# Patient Record
Sex: MALE | Race: WHITE | NOT HISPANIC OR LATINO | URBAN - NONMETROPOLITAN AREA
[De-identification: names, ages, dates, MRNs, and addresses within clinical notes are randomized per-mention and may not be internally consistent; named-entity substitution may affect disease eponyms.]

---

## 2018-01-01 ENCOUNTER — APPOINTMENT (RX ONLY)
Dept: URBAN - NONMETROPOLITAN AREA CLINIC 13 | Facility: CLINIC | Age: 83
Setting detail: DERMATOLOGY
End: 2018-01-01

## 2018-01-01 VITALS — WEIGHT: 175 LBS | HEIGHT: 69 IN

## 2018-01-01 DIAGNOSIS — Z86.007 PERSONAL HISTORY OF IN-SITU NEOPLASM OF SKIN: ICD-10-CM | Status: RESOLVED

## 2018-01-01 DIAGNOSIS — L57.0 ACTINIC KERATOSIS: ICD-10-CM

## 2018-01-01 DIAGNOSIS — L82.0 INFLAMED SEBORRHEIC KERATOSIS: ICD-10-CM

## 2018-01-01 DIAGNOSIS — D22 MELANOCYTIC NEVI: ICD-10-CM

## 2018-01-01 DIAGNOSIS — L82.1 OTHER SEBORRHEIC KERATOSIS: ICD-10-CM | Status: STABLE

## 2018-01-01 DIAGNOSIS — D18.0 HEMANGIOMA: ICD-10-CM

## 2018-01-01 DIAGNOSIS — L57.8 OTHER SKIN CHANGES DUE TO CHRONIC EXPOSURE TO NONIONIZING RADIATION: ICD-10-CM

## 2018-01-01 PROCEDURE — 17110 DESTRUCTION B9 LES UP TO 14: CPT | Mod: 59

## 2018-01-01 PROCEDURE — ? LIQUID NITROGEN

## 2018-01-01 PROCEDURE — 17271 DSTR MAL LES S/N/H/F/G 0.6-1: CPT

## 2018-01-01 PROCEDURE — 17004 DESTROY PREMAL LESIONS 15/>: CPT

## 2018-01-01 PROCEDURE — ? BIOPSY BY SHAVE METHOD AND DESTRUCTION

## 2018-01-01 PROCEDURE — ? COUNSELING

## 2018-01-01 PROCEDURE — 99213 OFFICE O/P EST LOW 20 MIN: CPT | Mod: 25

## 2018-01-01 PROCEDURE — ? SUNSCREEN RECOMMENDATIONS

## 2018-01-01 PROCEDURE — ? OBSERVATION

## 2018-01-01 ASSESSMENT — LOCATION DETAILED DESCRIPTION DERM
LOCATION DETAILED: RIGHT INFERIOR HELIX
LOCATION DETAILED: RIGHT SUPERIOR HELIX
LOCATION DETAILED: RIGHT CENTRAL TEMPLE
LOCATION DETAILED: LEFT SUPERIOR UPPER BACK
LOCATION DETAILED: RIGHT SUPERIOR CRUS OF ANTIHELIX
LOCATION DETAILED: RIGHT SUPERIOR LATERAL UPPER BACK
LOCATION DETAILED: LEFT SUPERIOR HELIX
LOCATION DETAILED: INFERIOR THORACIC SPINE
LOCATION DETAILED: LEFT INFERIOR MEDIAL UPPER BACK
LOCATION DETAILED: LEFT CLAVICULAR NECK
LOCATION DETAILED: RIGHT INFERIOR UPPER BACK
LOCATION DETAILED: LEFT SUPERIOR CRUS OF ANTIHELIX
LOCATION DETAILED: RIGHT MEDIAL ZYGOMA
LOCATION DETAILED: RIGHT INFERIOR LATERAL FOREHEAD
LOCATION DETAILED: LEFT INFERIOR FOREHEAD
LOCATION DETAILED: POSTERIOR MID-PARIETAL SCALP
LOCATION DETAILED: RIGHT SUPERIOR LATERAL MIDBACK
LOCATION DETAILED: LEFT MID-UPPER BACK
LOCATION DETAILED: SUPERIOR MID FOREHEAD
LOCATION DETAILED: RIGHT INFERIOR MEDIAL UPPER BACK

## 2018-01-01 ASSESSMENT — LOCATION ZONE DERM
LOCATION ZONE: SCALP
LOCATION ZONE: EAR
LOCATION ZONE: TRUNK
LOCATION ZONE: FACE
LOCATION ZONE: NECK

## 2018-01-01 ASSESSMENT — LOCATION SIMPLE DESCRIPTION DERM
LOCATION SIMPLE: RIGHT FOREHEAD
LOCATION SIMPLE: RIGHT ZYGOMA
LOCATION SIMPLE: RIGHT UPPER BACK
LOCATION SIMPLE: UPPER BACK
LOCATION SIMPLE: RIGHT TEMPLE
LOCATION SIMPLE: RIGHT BACK
LOCATION SIMPLE: RIGHT EAR
LOCATION SIMPLE: LEFT ANTERIOR NECK
LOCATION SIMPLE: POSTERIOR SCALP
LOCATION SIMPLE: LEFT UPPER BACK
LOCATION SIMPLE: LEFT FOREHEAD
LOCATION SIMPLE: SUPERIOR FOREHEAD
LOCATION SIMPLE: RIGHT LOWER BACK
LOCATION SIMPLE: LEFT EAR

## 2018-11-06 PROBLEM — L57.8 OTHER SKIN CHANGES DUE TO CHRONIC EXPOSURE TO NONIONIZING RADIATION: Status: ACTIVE | Noted: 2018-01-01

## 2018-11-06 PROBLEM — H91.90 UNSPECIFIED HEARING LOSS, UNSPECIFIED EAR: Status: ACTIVE | Noted: 2018-01-01

## 2018-11-06 PROBLEM — D22.5 MELANOCYTIC NEVI OF TRUNK: Status: ACTIVE | Noted: 2018-01-01

## 2018-11-06 PROBLEM — L29.8 OTHER PRURITUS: Status: ACTIVE | Noted: 2018-01-01

## 2018-11-06 PROBLEM — F41.9 ANXIETY DISORDER, UNSPECIFIED: Status: ACTIVE | Noted: 2018-01-01

## 2018-11-06 PROBLEM — D18.01 HEMANGIOMA OF SKIN AND SUBCUTANEOUS TISSUE: Status: ACTIVE | Noted: 2018-01-01

## 2018-11-06 PROBLEM — L57.0 ACTINIC KERATOSIS: Status: ACTIVE | Noted: 2018-01-01

## 2018-11-06 PROBLEM — L82.1 OTHER SEBORRHEIC KERATOSIS: Status: ACTIVE | Noted: 2018-01-01

## 2018-11-06 PROBLEM — Z85.828 PERSONAL HISTORY OF OTHER MALIGNANT NEOPLASM OF SKIN: Status: ACTIVE | Noted: 2018-01-01

## 2018-11-06 PROBLEM — C44.42 SQUAMOUS CELL CARCINOMA OF SKIN OF SCALP AND NECK: Status: ACTIVE | Noted: 2018-01-01

## 2018-11-06 PROBLEM — L82.0 INFLAMED SEBORRHEIC KERATOSIS: Status: ACTIVE | Noted: 2018-01-01

## 2018-11-06 PROBLEM — M12.9 ARTHROPATHY, UNSPECIFIED: Status: ACTIVE | Noted: 2018-01-01

## 2018-11-06 NOTE — PROCEDURE: LIQUID NITROGEN
Render Post-Care Instructions In Note?: no
Consent: The patient's consent was obtained including but not limited to risks of crusting, scabbing, blistering, scarring, darker or lighter pigmentary change, recurrence, incomplete removal and infection.
Detail Level: Detailed
Duration Of Freeze Thaw-Cycle (Seconds): 2
Post-Care Instructions: I reviewed with the patient in detail post-care instructions. Patient is to wear sunprotection, and avoid picking at any of the treated lesions. Pt may apply Vaseline to crusted or scabbing areas.
Medical Necessity Clause: This procedure was medically necessary because the lesions that were treated were:
Medical Necessity Information: It is in your best interest to select a reason for this procedure from the list below. All of these items fulfill various CMS LCD requirements except the new and changing color options.
Total Number Of Aks Treated: 18
Detail Level: Zone
Duration Of Freeze Thaw-Cycle (Seconds): 0

## 2018-11-06 NOTE — PROCEDURE: OBSERVATION
Body Location Override (Optional - Billing Will Still Be Based On Selected Body Map Location If Applicable): mid frontal scalp
Detail Level: Detailed
X Size Of Lesion In Cm (Optional): 0

## 2018-11-06 NOTE — PROCEDURE: BIOPSY BY SHAVE METHOD AND DESTRUCTION
Notification Instructions: Patient will be notified of biopsy results. However, patient instructed to call the office if not contacted within 2 weeks.
Bill For Surgical Tray: no
Bill As?: Malignant Destruction
Number Of Curettages: 3
Size Of Lesion In Cm (Optional): 0.8
Detail Level: Detailed
Anesthesia Type: 1% lidocaine with epinephrine
Size Of Lesion After Curettage: 0.9
Lab Facility: [id number]
Lab: [id number]
Billing Type: Third-Party Bill
Hemostasis: TCA 35%
Anesthesia Volume In Cc: 0.5
Wound Care: Petrolatum
Destruction Type: electrodesiccation
Consent: Verbal consent was obtained and risks were reviewed including but not limited to scarring, infection, bleeding, scabbing, incomplete removal, nerve damage and allergy to anesthesia.
Post-Care Instructions: I reviewed with the patient in detail post-care instructions. Patient is to keep the biopsy site dry overnight, and then apply petrolatum daily until healed. Patient may apply hydrogen peroxide soaks to remove any crusting.
Biopsy Type: H and E